# Patient Record
Sex: FEMALE | Race: WHITE | NOT HISPANIC OR LATINO | Employment: OTHER | URBAN - METROPOLITAN AREA
[De-identification: names, ages, dates, MRNs, and addresses within clinical notes are randomized per-mention and may not be internally consistent; named-entity substitution may affect disease eponyms.]

---

## 2024-08-02 ENCOUNTER — OFFICE VISIT (OUTPATIENT)
Dept: OBGYN CLINIC | Facility: MEDICAL CENTER | Age: 59
End: 2024-08-02
Payer: COMMERCIAL

## 2024-08-02 ENCOUNTER — APPOINTMENT (OUTPATIENT)
Dept: RADIOLOGY | Facility: MEDICAL CENTER | Age: 59
End: 2024-08-02
Payer: COMMERCIAL

## 2024-08-02 VITALS
DIASTOLIC BLOOD PRESSURE: 86 MMHG | BODY MASS INDEX: 28.55 KG/M2 | HEART RATE: 71 BPM | HEIGHT: 61 IN | SYSTOLIC BLOOD PRESSURE: 122 MMHG | WEIGHT: 151.2 LBS

## 2024-08-02 DIAGNOSIS — M25.552 PAIN IN LEFT HIP: Primary | ICD-10-CM

## 2024-08-02 DIAGNOSIS — M25.552 PAIN IN LEFT HIP: ICD-10-CM

## 2024-08-02 PROCEDURE — 99204 OFFICE O/P NEW MOD 45 MIN: CPT | Performed by: ORTHOPAEDIC SURGERY

## 2024-08-02 PROCEDURE — 73502 X-RAY EXAM HIP UNI 2-3 VIEWS: CPT

## 2024-08-02 RX ORDER — CETIRIZINE HYDROCHLORIDE 5 MG/1
5 TABLET, CHEWABLE ORAL DAILY
COMMUNITY

## 2024-08-02 RX ORDER — ALBUTEROL SULFATE 90 UG/1
2 AEROSOL, METERED RESPIRATORY (INHALATION) EVERY 6 HOURS PRN
COMMUNITY
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
## 2024-12-26 ENCOUNTER — HOSPITAL ENCOUNTER (OUTPATIENT)
Dept: HOSPITAL 99 - PAVMRI | Age: 59
End: 2024-12-26
Payer: COMMERCIAL

## 2024-12-26 DIAGNOSIS — R20.2: ICD-10-CM

## 2024-12-26 DIAGNOSIS — R20.0: Primary | ICD-10-CM

## 2025-01-21 ENCOUNTER — OFFICE VISIT (OUTPATIENT)
Dept: NEUROLOGY | Facility: CLINIC | Age: 60
End: 2025-01-21
Payer: COMMERCIAL

## 2025-01-21 ENCOUNTER — TRANSCRIBE ORDERS (OUTPATIENT)
Dept: SLEEP CENTER | Facility: CLINIC | Age: 60
End: 2025-01-21

## 2025-01-21 VITALS
TEMPERATURE: 97.7 F | BODY MASS INDEX: 28.77 KG/M2 | SYSTOLIC BLOOD PRESSURE: 120 MMHG | WEIGHT: 152.4 LBS | HEIGHT: 61 IN | DIASTOLIC BLOOD PRESSURE: 78 MMHG | HEART RATE: 81 BPM | OXYGEN SATURATION: 98 %

## 2025-01-21 DIAGNOSIS — Z87.820 HISTORY OF CONCUSSION: ICD-10-CM

## 2025-01-21 DIAGNOSIS — G47.9 SLEEP DIFFICULTIES: ICD-10-CM

## 2025-01-21 DIAGNOSIS — G44.309 POST-TRAUMATIC HEADACHE: ICD-10-CM

## 2025-01-21 DIAGNOSIS — G43.709 CHRONIC MIGRAINE WITHOUT AURA WITHOUT STATUS MIGRAINOSUS, NOT INTRACTABLE: Primary | ICD-10-CM

## 2025-01-21 DIAGNOSIS — G47.33 OSA (OBSTRUCTIVE SLEEP APNEA): ICD-10-CM

## 2025-01-21 DIAGNOSIS — G47.33 OSA (OBSTRUCTIVE SLEEP APNEA): Primary | ICD-10-CM

## 2025-01-21 PROCEDURE — G2211 COMPLEX E/M VISIT ADD ON: HCPCS | Performed by: STUDENT IN AN ORGANIZED HEALTH CARE EDUCATION/TRAINING PROGRAM

## 2025-01-21 PROCEDURE — 99205 OFFICE O/P NEW HI 60 MIN: CPT | Performed by: STUDENT IN AN ORGANIZED HEALTH CARE EDUCATION/TRAINING PROGRAM

## 2025-01-21 RX ORDER — VALACYCLOVIR HYDROCHLORIDE 1 G/1
2000 TABLET, FILM COATED ORAL 2 TIMES DAILY
COMMUNITY
Start: 2024-12-16

## 2025-01-21 RX ORDER — CYPROHEPTADINE HYDROCHLORIDE 4 MG/1
4 TABLET ORAL
Qty: 30 TABLET | Refills: 0 | Status: SHIPPED | OUTPATIENT
Start: 2025-01-21

## 2025-01-21 RX ORDER — FLUOXETINE 10 MG/1
1 CAPSULE ORAL DAILY
COMMUNITY
Start: 2024-12-17

## 2025-01-21 RX ORDER — MONTELUKAST SODIUM 10 MG/1
10 TABLET ORAL
COMMUNITY

## 2025-01-21 RX ORDER — FLUTICASONE PROPIONATE 110 UG/1
2 AEROSOL, METERED RESPIRATORY (INHALATION) 2 TIMES DAILY
COMMUNITY

## 2025-01-21 NOTE — PATIENT INSTRUCTIONS
Activity Plan:  General counseling as discussed regarding appropriate level of tolerable physical activity.      Gradual return to physical activity. It is ok to push through light symptoms, we just don't want to significantly exacerbate symptoms.     Additional Testing or Referrals:   - Labs: B12, Folate, TSH, Iron panel, Vitamin D  - Sleep study    Headache/migraine treatment:   Abortive medications (for immediate treatment of a headache): Ok to take ibuprofen or acetaminophen for headaches, but try to limit the amount and frequency that you are taking to avoid medication overuse/rebound headache. Ideally no more than 2-3 days per week.    Bridge  Cyproheptadine - 4mg tablet at bedtime for 30 nights  - Okay to take 1/2 tablet if needed    Over the counter preventive supplements for headaches/migraines - try for 2-3 months at least   (to take every day to help prevent headaches - not to take at the time of headache):  - Magnesium (oxide or glycinate) 400mg daily  - Can occasionally cause stomach upset - if so try at night, with food or stop, rarely can cause diarrhea if so stop.  - Riboflavin (Vitamin B2) 400mg daily - FYI B2 may make your urine bright/neon yellow - find online     Lifestyle Recommendations:  - Maintain good sleep hygiene.  Going to bed and waking up at consistent times, avoiding excessive daytime naps, avoiding caffeinated beverages in the evening, avoid excessive stimulation in the evening and generally using bed primarily for sleeping.  One hour before bedtime would recommend turning lights down lower, decreasing your activity (may read quietly, listen to music at a low volume). When you get into bed, should eliminate all technology (no texting, emailing, playing with your phone, iPad or tablet in bed).  - Maintain good hydration. Drink  2L of fluid a day (4 typical small water bottles)  - Maintain good nutrition. In particular don't skip meals and eat balanced meals regularly.    Education and  Follow-up  - Please contact us if any questions or concerns arise. Of course, try to protect yourself from head injuries, and if any new concerning symptoms or significant blow to the head or body go to the emergency department.  - Follow up in 3 months

## 2025-01-21 NOTE — ASSESSMENT & PLAN NOTE
Ms. Diaz is here today for evaluation of a suspected concussion that occurred in November.  She has been dealing with persistent symptoms since that time including nearly daily headaches, difficulties with sleep, and fatigue.  Outside of getting an MRI with her primary care provider, she has not had any other sort of treatment or intervention for this injury.  We spent an extensive amount of time discussing the nature of concussion and persistent symptoms.  I believe that her headaches are a primary reason for this.  Given the description of her symptoms as well as her prior history of headaches, I suspect she has chronic migraines.  We discussed a variety of potential treatment options, but she was open to trying magnesium and B2 supplements first.  From an abortive standpoint, I will bridge her with a 30-day course of cyproheptadine and emphasized the importance of not taking over-the-counter medication more than 2 to 3 days/week.  Due to the persistent nature of her symptoms I have also ordered some labs as below.  I am also concerned about the possibility of sleep apnea contributing to persistent symptoms as well as morning headaches so I have recommended that she get a sleep study for further evaluation.  Finally, part of today's discussion emphasized the importance of aerobic exercise in recovery so she will work to try and be more active.
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
## 2025-01-21 NOTE — PROGRESS NOTES
Review of Systems   Constitutional:  Negative for appetite change, fatigue and fever.   HENT:  Positive for tinnitus. Negative for hearing loss, trouble swallowing and voice change.    Eyes:  Positive for visual disturbance (difficulty focusing eyes). Negative for photophobia and pain.   Respiratory: Negative.  Negative for shortness of breath.    Cardiovascular: Negative.  Negative for palpitations.   Gastrointestinal:  Positive for nausea. Negative for vomiting.   Endocrine: Negative.  Negative for cold intolerance.   Genitourinary: Negative.  Negative for dysuria, frequency and urgency.   Musculoskeletal:  Negative for back pain, gait problem, myalgias, neck pain and neck stiffness.   Skin: Negative.  Negative for rash.   Allergic/Immunologic: Negative.    Neurological:  Positive for headaches (25-30/30 HA - 5/7 weekly Tylenol/Advil). Negative for dizziness, tremors, seizures, syncope, facial asymmetry, speech difficulty, weakness, light-headedness and numbness.   Hematological: Negative.  Does not bruise/bleed easily.   Psychiatric/Behavioral: Negative.  Negative for confusion, hallucinations and sleep disturbance.    All other systems reviewed and are negative.

## 2025-01-22 ENCOUNTER — HOSPITAL ENCOUNTER (OUTPATIENT)
Dept: CT IMAGING | Facility: HOSPITAL | Age: 60
Discharge: HOME | End: 2025-01-22
Payer: COMMERCIAL

## 2025-01-22 DIAGNOSIS — J34.89 SINUS PRESSURE: ICD-10-CM

## 2025-01-22 DIAGNOSIS — R09.82 PND (POST-NASAL DRIP): ICD-10-CM

## 2025-01-22 DIAGNOSIS — J34.89 RHINORRHEA: ICD-10-CM

## 2025-01-22 LAB
25(OH)D3+25(OH)D2 SERPL-MCNC: 30.6 NG/ML (ref 30–100)
FERRITIN SERPL-MCNC: 147 NG/ML (ref 15–150)
FOLATE SERPL-MCNC: 12.8 NG/ML
IRON SATN MFR SERPL: 8 % (ref 15–55)
IRON SERPL-MCNC: 30 UG/DL (ref 27–159)
TIBC SERPL-MCNC: 372 UG/DL (ref 250–450)
TSH SERPL DL<=0.005 MIU/L-ACNC: 0.86 UIU/ML (ref 0.45–4.5)
UIBC SERPL-MCNC: 342 UG/DL (ref 131–425)
VIT B12 SERPL-MCNC: 431 PG/ML (ref 232–1245)

## 2025-01-22 PROCEDURE — 70486 CT MAXILLOFACIAL W/O DYE: CPT

## 2025-02-04 DIAGNOSIS — G44.309 POST-TRAUMATIC HEADACHE: ICD-10-CM

## 2025-02-04 DIAGNOSIS — G43.709 CHRONIC MIGRAINE WITHOUT AURA WITHOUT STATUS MIGRAINOSUS, NOT INTRACTABLE: ICD-10-CM

## 2025-02-05 RX ORDER — CYPROHEPTADINE HYDROCHLORIDE 4 MG/1
4 TABLET ORAL
Qty: 90 TABLET | Refills: 1 | OUTPATIENT
Start: 2025-02-05

## 2025-02-06 ENCOUNTER — HOSPITAL ENCOUNTER (OUTPATIENT)
Dept: HOSPITAL 99 - WDC | Age: 60
End: 2025-02-06
Payer: COMMERCIAL

## 2025-02-06 DIAGNOSIS — Z13.820: ICD-10-CM

## 2025-02-06 DIAGNOSIS — Z12.31: ICD-10-CM

## 2025-02-06 DIAGNOSIS — Z12.39: Primary | ICD-10-CM

## 2025-02-25 ENCOUNTER — HOSPITAL ENCOUNTER (OUTPATIENT)
Facility: HOSPITAL | Age: 60
Discharge: HOME | End: 2025-02-25
Payer: COMMERCIAL

## 2025-02-25 DIAGNOSIS — G47.33 OSA (OBSTRUCTIVE SLEEP APNEA): ICD-10-CM

## 2025-02-25 PROCEDURE — G0399 HOME SLEEP TEST/TYPE 3 PORTA: HCPCS

## 2025-02-25 NOTE — PROGRESS NOTES
Home Sleep Study Documentation    HOME STUDY DEVICE: Noxturnal no                                           Pallavi G3 yes device # 24      Pre-Sleep Home Study:    Set-up and instructions performed by: Melissa    Technician performed demonstration for Patient: yes    Return demonstration performed by Patient: yes    Written instructions provided to Patient: yes    Patient signed consent form: yes        Post-Sleep Home Study:    Additional comments by Patient:       Home Sleep Study Failed:no:    Failure reason: N/A    Reported or Detected: N/A    Scored by: LAURA Reina

## 2025-02-26 PROCEDURE — 95806 SLEEP STUDY UNATT&RESP EFFT: CPT | Performed by: INTERNAL MEDICINE

## 2025-02-27 ENCOUNTER — RESULTS FOLLOW-UP (OUTPATIENT)
Dept: NEUROLOGY | Facility: CLINIC | Age: 60
End: 2025-02-27

## 2025-02-27 DIAGNOSIS — G47.33 OSA (OBSTRUCTIVE SLEEP APNEA): Primary | ICD-10-CM

## 2025-03-06 DIAGNOSIS — G43.709 CHRONIC MIGRAINE WITHOUT AURA WITHOUT STATUS MIGRAINOSUS, NOT INTRACTABLE: ICD-10-CM

## 2025-03-06 DIAGNOSIS — G44.309 POST-TRAUMATIC HEADACHE: ICD-10-CM

## 2025-03-07 RX ORDER — CYPROHEPTADINE HYDROCHLORIDE 4 MG/1
4 TABLET ORAL
Qty: 90 TABLET | Refills: 1 | Status: SHIPPED | OUTPATIENT
Start: 2025-03-07

## 2025-03-18 ENCOUNTER — TELEPHONE (OUTPATIENT)
Dept: SLEEP CENTER | Facility: CLINIC | Age: 60
End: 2025-03-18

## 2025-03-18 ENCOUNTER — OFFICE VISIT (OUTPATIENT)
Dept: SLEEP CENTER | Facility: CLINIC | Age: 60
End: 2025-03-18
Payer: COMMERCIAL

## 2025-03-18 VITALS
DIASTOLIC BLOOD PRESSURE: 72 MMHG | BODY MASS INDEX: 28.51 KG/M2 | HEIGHT: 61 IN | HEART RATE: 70 BPM | OXYGEN SATURATION: 98 % | SYSTOLIC BLOOD PRESSURE: 118 MMHG | WEIGHT: 151 LBS

## 2025-03-18 DIAGNOSIS — G43.709 CHRONIC MIGRAINE WITHOUT AURA WITHOUT STATUS MIGRAINOSUS, NOT INTRACTABLE: ICD-10-CM

## 2025-03-18 DIAGNOSIS — J30.9 ALLERGIC RHINITIS, UNSPECIFIED SEASONALITY, UNSPECIFIED TRIGGER: ICD-10-CM

## 2025-03-18 DIAGNOSIS — G47.33 OSA (OBSTRUCTIVE SLEEP APNEA): Primary | ICD-10-CM

## 2025-03-18 PROCEDURE — 99204 OFFICE O/P NEW MOD 45 MIN: CPT | Performed by: STUDENT IN AN ORGANIZED HEALTH CARE EDUCATION/TRAINING PROGRAM

## 2025-03-18 RX ORDER — NAPROXEN SODIUM 220 MG/1
TABLET, FILM COATED ORAL
COMMUNITY

## 2025-03-18 RX ORDER — METHYLPREDNISOLONE 4 MG/1
TABLET ORAL
COMMUNITY
Start: 2024-12-27

## 2025-03-18 RX ORDER — NIACIN 250 MG
400 TABLET ORAL
COMMUNITY

## 2025-03-18 RX ORDER — CHOLECALCIFEROL (VITAMIN D3) 25 MCG
CAPSULE ORAL DAILY
COMMUNITY

## 2025-03-18 NOTE — ASSESSMENT & PLAN NOTE
There is an association of migraine headaches and sleep disordered breathing.  The patient reports regular a.m. headaches lasting less than 30 minutes that are more frontal in nature.  We discussed that this may be related to sleep disordered breathing.  Patient will initiate CPAP as above and follow-up within 3 months.  Orders:    CPAP [facility] DME

## 2025-03-18 NOTE — PROGRESS NOTES
Name: Carina Diaz      : 1965      MRN: [id number]  Encounter Provider: Emmanuel Rodriguez MD  Encounter Date: 3/18/2025   Encounter department: [facility] SLEEP MEDICINE [facility]    :  Assessment & Plan  ELIO (obstructive sleep apnea)  Moderate Obstructive Sleep Apnea - Discussed pathophysiology of ELIO, consequences of untreated ELIO and treatment options including PAP therapy. - Discussed risks of sleepy driving and mitigation strategies (napping). Patient agrees to not drive if tired or sleepy. - Advised avoidance of alcohol and centrally acting medications as these can worsen ELIO.  -Patient presents for follow-up of recent home sleep study which revealed mild almost moderate obstructive sleep apnea with significant hypoxia.  We reviewed the home sleep study results in detail today in the office.  - The patient elects to start auto CPAP therapy.  I have placed an order for an auto CPAP device with a nasal pillow interface for the patient today in the office.  I reviewed insurance compliance including use the machine for at least 4 hours nightly for at least 70% of nights.  - I have asked the patient to return to the office within the next 3 months for close clinical follow-up and initial CPAP compliance visit.  Patient verbalized understanding.  Orders:    Ambulatory Referral to Sleep Medicine    CPAP Auto New DME    Chronic migraine without aura without status migrainosus, not intractable  There is an association of migraine headaches and sleep disordered breathing.  The patient reports regular a.m. headaches lasting less than 30 minutes that are more frontal in nature.  We discussed that this may be related to sleep disordered breathing.  Patient will initiate CPAP as above and follow-up within 3 months.  Orders:    CPAP Auto New DME    Allergic rhinitis, unspecified seasonality, unspecified trigger  Patient with history of regular nasal sinus congestion.  I reviewed the importance of the  "nose and nasal breathing on sleep.  Continue control of nasal sinus congestion and follow-up for initial CPAP compliance visit as above.         History of Present Illness       Sitting and reading: (Patient-Rptd) Would never doze  Watching TV: (Patient-Rptd) Slight chance of dozing  Sitting, inactive in a public place (e.g. a theatre or a meeting): (Patient-Rptd) Would never doze  As a passenger in a car for an hour without a break: (Patient-Rptd) Would never doze  Lying down to rest in the afternoon when circumstances permit: (Patient-Rptd) Moderate chance of dozing  Sitting and talking to someone: (Patient-Rptd) Would never doze  Sitting quietly after a lunch without alcohol: (Patient-Rptd) Slight chance of dozing  In a car, while stopped for a few minutes in traffic: (Patient-Rptd) Would never doze  Total score: (Patient-Rptd) 4     Pertinent positives/negatives included in HPI and also as noted:       Objective   /72   Pulse 70   Ht 5' 1\" (1.549 m)   Wt 68.5 kg (151 lb)   SpO2 98%   BMI 28.53 kg/m²     Neck Circumference: 13  [facility] New Patient Evaluation    Ms. Diaz is a 59 y.o. female with a PMH of migraine headaches, who presents as a new patient for evaluation of sleep disordered breathing.     I have reviewed the 2/7/2025 otolaryngology office note with Kirk Gunn MD.     I have reviewed the 1/21/2025 neurology office note with    Rohan Mane DO.     History of Presenting Illness:    The patient snores. There are choking and gasping episodes. There are no witnessed apneas. 1-2x episode(s) of nocturia occur per night. The patient sleeps on her side. She sleeps with one pillow. There are no reports of nocturnal behaviors.     The patient's [place] sleepiness scale score is 4/24 (<=10 is normal). She has not been sleepy while driving. She has not had a fall-asleep motor vehicle accident. There are no reports of hypnagogic hallucinations, cataplexy or sleep paralysis.    In " terms of the patient's sleep/wake cycle symptoms:  Bedtime: 9 PM  SOL: Less than 15 minutes  Nocturnal awakenings: 1-2 X, nocturia  Wakeup time: 6:30 AM  Naps: Denied    Total sleep time estimate: Approximately 8-9 hours.     On weekends she will fall asleep around 10 PM and sleep until 6:30 AM.    She has tried Benadryl for poor sleep in the past.    Her legs do not bother her on trying to initiate sleep regularly.    She underwent a home sleep study on 2/25/2025 at a weight of 152 pounds.  This home sleep study revealed mild almost moderate obstructive sleep apnea with an NICK of 14.7 events per hour.  O2 kimberly 79%.  There was significant positional component to sleep disordered breathing.  The patient also spent approximately 32 minutes with SpO2 under 90%.    Patient elects to start with auto CPAP for sleep disordered breathing.  For close clinical follow-up as above.    No past medical history on file.     No past surgical history on file.     No prior upper airway surgeries in the past.     Allergies   Allergen Reactions    Erythromycin Vomiting        Current Outpatient Medications on File Prior to Visit   Medication Sig Dispense Refill    albuterol (PROVENTIL HFA,VENTOLIN HFA) 90 mcg/act inhaler Inhale 2 puffs every 6 (six) hours as needed for wheezing      cetirizine (ZyrTEC) 5 MG chewable tablet Chew 5 mg daily      Cholecalciferol (KP Vitamin D) 25 MCG (1000 UT) capsule Take by mouth daily      FLUoxetine (PROzac) 10 mg capsule Take 1 capsule by mouth in the morning      fluticasone (FLOVENT HFA) 110 MCG/ACT inhaler Inhale 2 puffs 2 (two) times a day Rinse mouth after use.      Magnesium Bisglycinate (MAG GLYCINATE PO) Take 400 mg by mouth      methylPREDNISolone 4 MG tablet therapy pack TAKE 6 TABLETS ON DAY 1 AS DIRECTED ON PACKAGE AND DECREASE BY 1 TAB EACH DAY FOR A TOTAL OF 6 DAYS      montelukast (SINGULAIR) 10 mg tablet Take 10 mg by mouth daily at bedtime      naproxen sodium (ALEVE) 220 MG tablet  "Take by mouth      niacin 250 MG tablet Take 400 mg by mouth daily with breakfast      valACYclovir (VALTREX) 1,000 mg tablet Take 2,000 mg by mouth 2 (two) times a day      cyproheptadine (PERIACTIN) 4 mg tablet TAKE 1 TABLET BY MOUTH DAILY AT BEDTIME 90 tablet 1    ipratropium (ATROVENT) 0.03 % nasal spray SPRAY 2 SPRAYS INTO INTO EACH NOSTRIL 3 TIMES A DAY AS NEEDED FOR RHINITIS 90 mL 3     No current facility-administered medications on file prior to visit.     Family History: Her family history is not on file.    Father had sleep apnea and was fitted for a CPAP.     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     Patient's medications, allergies, past medical, surgical, social and family histories were reviewed in the electronic medical record and updated as appropriate.    Review of Systems: On review of systems, the patient reports: Regular AM Headaches, occasional regular nasal sinus congestion, does not wake with dry mouth and dry throat in morning.    Vitals:    03/18/25 0948   BP: 118/72   Pulse: 70   SpO2: 98%       Physical Examination:  Gen: No acute distress, not visibly anxious, speaking comfortably  H&N: MM III; no retro/micrognathia; no macroglossia; no visible thyromegaly  Neuro: Alert and oriented x3, interactive  Psych: Pleasant, normal affect  Skin: No visible rashes  Respiratory: No accessory muscle use, breathing comfortably  Cardiac: No visible edema of extremities  Abdomen: Soft, NT, nondistended  Musculoskeletal: Normal ROM Intact of Extremities    Study Results:  I reviewed the following labs:    Lab Results   Component Value Date    FERRITIN 147 01/21/2025       No results found for: \"WBC\", \"HGB\", \"HCT\", \"MCV\", \"PLT\"    This SmartLink has not been configured with any " "valid records.       No results found for: \"SODIUM\", \"K\", \"CL\", \"CO2\", \"BUN\", \"CREATININE\", \"GLUC\", \"CALCIUM\"    This SmartLink has not been configured with any valid records.       Lab Results   Component Value Date    TSH 0.861 01/21/2025          Results/Data:  I have reviewed the results and report from the 1/22/2025 CT sinus.    Independent Findings Reviewed: Sinus mucosal disease.  Severely narrowed or nearly occluded left maxillary sinus ostium.  Markedly leftward deviated nasal septum.    I have reviewed the results and report from the 8/2/24 hip x-ray.    Independent Findings Reviewed: No acute osseous abnormality of the hip.    I answered the patient's questions to the best of my ability. We will continue with longitudinal follow-up for evaluation of sleep disordered breathing. Follow-up will be after sleep testing is completed.    Emmanuel Rodriguez MD  Sleep Medicine and Internal Medicine  [facility]  03/18/25      Portions of the record may have been created with voice recognition software.  Occasional wrong word or \"sound a like\" substitutions may have occurred due to the inherent limitations of voice recognition software.  Read the chart carefully and recognize, using context, where substitutions have occurred.       "

## 2025-03-18 NOTE — PATIENT INSTRUCTIONS
"Starting CPAP    1.  The home medical company will give you a call within the next 2 weeks to set up your new CPAP machine.  You will be able to be set up at multiple locations.  Pick the one that is easiest for your schedule and attend that appointment to get started on CPAP.    2.  You will be initially prescribed a mask interface to use with the CPAP machine.  Try the CPAP machine for the first 7 to 10 days, if after that time you are having difficulty adjusting to CPAP please call the [employer] system company and they will allow you to switch out the mask multiple times during the first month.    3.  Start using CPAP at night or during the day to acclimate to using a mask interface.  Continue to use regularly until you are seen in follow-up.  Reminder that insurance companies will ask for you to use the machine for at least 4 hours in a 24-hour period for at least 70% of days.  Please make sure you are using the machine regularly so we can assess your compliance and follow-up and so you meet insurance compliance rules.    4.  When you are seen in follow-up, we will make adjustments to the CPAP machine as necessary to make it more comfortable for you.  There are alternative mask, pressure changes, humidification changes, and alternative ways we can make CPAP easier and more tolerable for you at night.        Patient Education     Sleep apnea in adults   The Basics   Written by the doctors and editors at [facility]   What is sleep apnea? -- Sleep apnea is a condition that makes you stop breathing for short periods while you are asleep. There are 2 types of sleep apnea. One is called \"obstructive sleep apnea.\" The other is called \"central sleep apnea.\"  In obstructive sleep apnea, you stop breathing because your throat narrows or closes (figure 1). In central sleep apnea, you stop breathing because your brain does not send the right signals to your muscles to make you breathe. When people talk about sleep apnea, they are " "usually referring to obstructive sleep apnea, which is what this article is about.  People with sleep apnea do not know that they stop breathing when they are asleep. But they do sometimes wake up startled or gasping for breath. They also often hear from loved ones that they snore.  What are the symptoms of sleep apnea? -- The main symptoms of sleep apnea are loud snoring, tiredness, and daytime sleepiness. Other symptoms can include:   Restless sleep   Waking up choking or gasping   Morning headaches, dry mouth, or sore throat   Waking up often to urinate   Waking up feeling unrested or groggy   Trouble thinking clearly or remembering things  Some people with sleep apnea don't have symptoms, or don't realize that they have them.  Should I see a doctor or nurse? -- Yes. If you think that you might have sleep apnea, see your doctor.  Is there a test for sleep apnea? -- Yes. First, your doctor or nurse will ask about your symptoms. If you have a bed partner, they might also ask that person if you snore or gasp in your sleep. If the doctor or nurse suspects that you have sleep apnea, they might send you for a \"sleep study.\"  Sleep studies can sometimes be done at home, but they are usually done in a sleep lab. For the study, you spend the night in the lab, and you are hooked up to different machines that monitor your heart rate, breathing, and other body functions. The results of the test tell your doctor or nurse if you have the disorder.  Is there anything I can do on my own to help my sleep apnea? -- Yes. Some things that might help:   Try to avoid sleeping on your back, if possible. This might help some people.   Lose weight, if you have excess body weight.   Get regular physical activity. This might help you lose weight. But even if it doesn't, being active is good for your health.   Avoid alcohol, especially in the evening. Alcohol can make sleep apnea worse.  How is sleep apnea treated? -- Treatment can " "include:   Weight loss - As mentioned above, weight loss can help if you have excess weight or obesity. But losing weight can be challenging, and it takes time to lose enough weight to help with your sleep apnea. Most people need other treatment while they work on losing weight.   CPAP - The most effective treatment for sleep apnea is a device that keeps your airway open while you sleep. Treatment with this device is called \"continuous positive airway pressure\" (\"CPAP\"). People getting CPAP wear a face mask at night that keeps them breathing (figure 2).  If your doctor or nurse recommends a CPAP machine, be patient about using it. The mask might seem uncomfortable to wear at first, and the machine might seem noisy, but using the machine can really help you. People with sleep apnea who use a CPAP machine feel more rested and generally feel better.  If CPAP does not work, your doctor might suggest other treatment. Options might include:   An oral device - This is a device that you wear in your mouth. It is called an \"oral appliance\" or \"mandibular advancement device.\" It helps keep your airway open while you sleep.   Hypoglossal nerve stimulation - This involves a procedure to implant a small device into your chest. The device has a wire that connects to the nerve under your tongue. While you are sleeping, it sends an electrical signal that causes the tongue to push forward. This helps open up your airway.   Surgery to widen your airway - This might involve removing your tonsils or other tissue that blocks the airway.  Is sleep apnea dangerous? -- It can be. Risks include:   Accidents - People with sleep apnea do not get good-quality sleep, so they are often tired and not alert. This puts them at risk for car accidents and other types of accidents.   Other health problems - Studies show that people with sleep apnea are more likely than others to have high blood pressure, heart attacks, and other serious heart " problems. Some people also have mood changes or depression.  In people with severe sleep apnea, getting treated (for example, with CPAP) can help lower these risks.  All topics are updated as new evidence becomes available and our peer review process is complete.  This topic retrieved from [employer] on: Feb 28, 2024.  Topic 93440 Version 18.0  Release: 32.2.4 - C32.58  © 2024 UpToDate, Inc. and/or its affiliates. All rights reserved.  figure 1: Airway in a person with sleep apnea     Normally, when a person sleeps, the airway remains open, and air can pass from the nose and mouth to the lungs. In a person with sleep apnea, parts of the throat and mouth drop into the airway and block off the flow of air. This can cause loud snoring and interrupt breathing for short periods.  Graphic 43996 Version 6.0  figure 2: Continuous positive airway pressure (CPAP) for sleep apnea     The CPAP mask gently blows air into your nose while you sleep. It puts just enough pressure on your airway to keep it from closing. The mask in this picture fits over just the nose. Other CPAP devices have masks that fit over the nose and mouth.  Graphic 02849 Version 5.0  Consumer Information Use and Disclaimer   Disclaimer: This generalized information is a limited summary of diagnosis, treatment, and/or medication information. It is not meant to be comprehensive and should be used as a tool to help the user understand and/or assess potential diagnostic and treatment options. It does NOT include all information about conditions, treatments, medications, side effects, or risks that may apply to a specific patient. It is not intended to be medical advice or a substitute for the medical advice, diagnosis, or treatment of a health care provider based on the health care provider's examination and assessment of a patient's specific and unique circumstances. Patients must speak with a health care provider for complete information about their health,  medical questions, and treatment options, including any risks or benefits regarding use of medications. This information does not endorse any treatments or medications as safe, effective, or approved for treating a specific patient. UpToDate, Inc. and its affiliates disclaim any warranty or liability relating to this information or the use thereof.The use of this information is governed by the Terms of Use, available at https://www.[employer].com/en/know/clinical-effectiveness-terms. 2024© UpToDate, Inc. and its affiliates and/or licensors. All rights reserved.  Copyright   © 2024 UpToDate, Inc. and/or its affiliates. All rights reserved.

## 2025-03-18 NOTE — ASSESSMENT & PLAN NOTE
Moderate Obstructive Sleep Apnea - Discussed pathophysiology of ELIO, consequences of untreated ELIO and treatment options including PAP therapy. - Discussed risks of sleepy driving and mitigation strategies (napping). Patient agrees to not drive if tired or sleepy. - Advised avoidance of alcohol and centrally acting medications as these can worsen ELIO.  -Patient presents for follow-up of recent home sleep study which revealed mild almost moderate obstructive sleep apnea with significant hypoxia.  We reviewed the home sleep study results in detail today in the office.  - The patient elects to start auto CPAP therapy.  I have placed an order for an auto CPAP device with a nasal pillow interface for the patient today in the office.  I reviewed insurance compliance including use the machine for at least 4 hours nightly for at least 70% of nights.  - I have asked the patient to return to the office within the next 3 months for close clinical follow-up and initial CPAP compliance visit.  Patient verbalized understanding.  Orders:    Ambulatory Referral to Sleep Medicine    CPAP Auto New DME

## 2025-03-18 NOTE — TELEPHONE ENCOUNTER
Rx for APAP and clinicals sent to [facility] via [place].

## 2025-03-19 LAB

## 2025-03-21 ENCOUNTER — HOSPITAL ENCOUNTER (OUTPATIENT)
Dept: HOSPITAL 99 - RCS | Age: 60
End: 2025-03-21
Payer: COMMERCIAL

## 2025-03-21 DIAGNOSIS — Z71.89: Primary | ICD-10-CM

## 2025-03-26 LAB

## 2025-04-07 LAB

## 2025-04-18 ENCOUNTER — TELEPHONE (OUTPATIENT)
Age: 60
End: 2025-04-18

## 2025-04-18 NOTE — TELEPHONE ENCOUNTER
Patient called to [facility] appointment due to a family emergency; scheduled 6/3/25 9:30 AM DO KAHLIL Vale.  Patient stated she is doing much better since utilizing CPAP and wanted Dr. Mane to be aware; stated currently taking supplements as advised by provider and wanted to know how long she should be taking them (Magnesium (oxide or glycinate) 400mg daily and Riboflavin (Vitamin B2) 400mg daily).  Patient requested someone respond via my chart message.  Advised will forward request.    Please assist,    Thank you

## 2025-06-02 ENCOUNTER — OFFICE VISIT (OUTPATIENT)
Dept: SLEEP CENTER | Facility: HOSPITAL | Age: 60
End: 2025-06-02
Payer: COMMERCIAL

## 2025-06-02 VITALS
DIASTOLIC BLOOD PRESSURE: 76 MMHG | HEART RATE: 75 BPM | SYSTOLIC BLOOD PRESSURE: 110 MMHG | OXYGEN SATURATION: 95 % | BODY MASS INDEX: 28.7 KG/M2 | WEIGHT: 152 LBS | HEIGHT: 61 IN

## 2025-06-02 DIAGNOSIS — G47.33 OSA (OBSTRUCTIVE SLEEP APNEA): Primary | ICD-10-CM

## 2025-06-02 PROCEDURE — 99213 OFFICE O/P EST LOW 20 MIN: CPT | Performed by: PSYCHIATRY & NEUROLOGY

## 2025-06-02 NOTE — ASSESSMENT & PLAN NOTE
-Doing great but describes aerophagia from her CPAP machine, likely would do well with her reduction in pressure  - As such, I reduce her CPAP settings to 4-9 cm H2O and turn the EPR up to 3  -Discussed that when her allergies resolve she should return to the nasal pillows which were more comfortable for her  Orders:  •  PAP DME Pressure Change

## 2025-06-02 NOTE — PATIENT INSTRUCTIONS
If you do not like the air pressure change on your machine, please contact me through the patient portal.  Sometimes it may take a few days to get used to the new air pressure.     You are doing great with use of your machine.  .  On your prior diagnostic sleep study you had 14.7 times per hour of sleep apnea events.  With your machine, you have 1 times per hour of sleep apnea episodes.     The goal is to use CPAP all night long, more usage= more health benefit and more symptomatic improvement.  If any barriers or problems arise that lead to difficulty using your machine, please let me know either through a [employer] message or alternatively, by calling my office.

## 2025-06-02 NOTE — PROGRESS NOTES
Name: Carina Diaz      : 1965      MRN: [id number]  Encounter Provider: Kirk Vilchis MD  Encounter Date: 2025   Encounter department: [facility] [place]  :  Assessment & Plan  ELIO (obstructive sleep apnea)  -Doing great but describes aerophagia from her CPAP machine, likely would do well with her reduction in pressure  - As such, I reduce her CPAP settings to 4-9 cm H2O and turn the EPR up to 3  -Discussed that when her allergies resolve she should return to the nasal pillows which were more comfortable for her  Orders:  •  PAP DME Pressure Change      Assessment & Plan        History of Present Illness   History of Present Illness  This is a 59-year-old female who presents in follow-up, she is new to my care.  She last saw Dr. Denny Rodriguez in 2025.  At that visit, he reviewed her sleep study with her which showed mild, almost moderate obstructive sleep apnea with a respiratory event index of 14.7, events worse in the supine position.  She had presented with symptoms of snoring, choking gasping, nocturia    Cc- things are great unless I'm congested    Carina describes that CPAP has been very beneficial to her.  She has more energy and does not snore.  She notes that she did well with nasal pillows but due to allergies she recently switched to a fullface mask.  With a fullface mask she finds it hard to exhale and has had bloating in her stomach/aerophagia.  She has been awakening with headaches more often but these headaches are different than what she had prior to treating obstructive sleep apnea.  For the most part her headaches are improved with use of CPAP    CPAP data reviewed by me today  AirSense 11 set at 6 to 15 cm H2O  Average session 5 hours 4 minutes used 27 over 30 days  AHI 0.8  Median pressure 7.1     95% pressure 9.8  No significant mask leakage  Uses a full face mask     Carina has some AM headaches and finds it hard to exhale at times;  sometimes  Pt PASSR completed for [facility] "removes her mask at night     Benefits of PAP- better sleep, more energy, improvement in headaches     Carina describes allergies and asthma, under treatment.  Sees Dr Mane in [facility] for allergies.             Sitting and reading: Would never doze  Watching TV: Slight chance of dozing  Sitting, inactive in a public place (e.g. a theatre or a meeting): Would never doze  As a passenger in a car for an hour without a break: Would never doze  Lying down to rest in the afternoon when circumstances permit: Moderate chance of dozing  Sitting and talking to someone: Would never doze  Sitting quietly after a lunch without alcohol: Would never doze  In a car, while stopped for a few minutes in traffic: Would never doze  Total score: 3     Review of Systems  Pertinent positives/negatives included in HPI and also as noted:       Objective   /76 (BP Location: Left arm, Patient Position: Sitting, Cuff Size: Large)   Pulse 75   Ht 5' 1\" (1.549 m)   Wt 68.9 kg (152 lb)   SpO2 95%   BMI 28.72 kg/m²        Physical Exam  Physical Exam    Visit Vitals  /76 (BP Location: Left arm, Patient Position: Sitting, Cuff Size: Large)   Pulse 75   Ht 5' 1\" (1.549 m)   Wt 68.9 kg (152 lb)   SpO2 95%   BMI 28.72 kg/m²   Smoking Status Never   BSA 1.68 m²                                               Results    Data  No results found for: \"HGB\", \"HCT\", \"MCV\"   No results found for: \"GLUCOSE\", \"CALCIUM\", \"NA\", \"K\", \"CO2\", \"CL\", \"BUN\", \"CREATININE\"  Lab Results   Component Value Date    IRON 30 01/21/2025    TIBC 372 01/21/2025    FERRITIN 147 01/21/2025     No results found for: \"AST\", \"ALT\"      "

## 2025-06-03 ENCOUNTER — TELEPHONE (OUTPATIENT)
Dept: SLEEP CENTER | Facility: CLINIC | Age: 60
End: 2025-06-03

## 2025-06-03 ENCOUNTER — OFFICE VISIT (OUTPATIENT)
Dept: NEUROLOGY | Facility: CLINIC | Age: 60
End: 2025-06-03
Payer: COMMERCIAL

## 2025-06-03 VITALS
HEIGHT: 61 IN | HEART RATE: 79 BPM | BODY MASS INDEX: 28.85 KG/M2 | DIASTOLIC BLOOD PRESSURE: 70 MMHG | TEMPERATURE: 98 F | WEIGHT: 152.8 LBS | SYSTOLIC BLOOD PRESSURE: 124 MMHG | OXYGEN SATURATION: 99 %

## 2025-06-03 DIAGNOSIS — G47.33 OSA (OBSTRUCTIVE SLEEP APNEA): ICD-10-CM

## 2025-06-03 DIAGNOSIS — G44.309 POST-TRAUMATIC HEADACHE: ICD-10-CM

## 2025-06-03 DIAGNOSIS — G43.709 CHRONIC MIGRAINE WITHOUT AURA WITHOUT STATUS MIGRAINOSUS, NOT INTRACTABLE: Primary | ICD-10-CM

## 2025-06-03 DIAGNOSIS — Z87.820 HISTORY OF CONCUSSION: ICD-10-CM

## 2025-06-03 LAB
DME PARACHUTE DELIVERY DATE ACTUAL: NORMAL
DME PARACHUTE DELIVERY DATE REQUESTED: NORMAL
DME PARACHUTE ITEM DESCRIPTION: NORMAL
DME PARACHUTE ORDER STATUS: NORMAL
DME PARACHUTE SUPPLIER NAME: NORMAL
DME PARACHUTE SUPPLIER PHONE: NORMAL

## 2025-06-03 PROCEDURE — 99213 OFFICE O/P EST LOW 20 MIN: CPT | Performed by: STUDENT IN AN ORGANIZED HEALTH CARE EDUCATION/TRAINING PROGRAM

## 2025-06-03 NOTE — ASSESSMENT & PLAN NOTE
At today's visit, she reports that she is doing exceptionally well.  She feels that the majority of her symptoms including her headaches have significantly improved since our last visit.  She continues with magnesium and B2 supplements daily.  She may continue with those long-term if she prefers or she can try and slowly decrease the dose and stop them.  She is also working with sleep medicine regularly and using the CPAP nightly which she finds to be extremely effective with the quality of her sleep.  She will follow-up with me as needed going forward.

## 2025-06-03 NOTE — PROGRESS NOTES
Neurology Ambulatory Visit  Name: Carina Diaz       : 1965       MRN: [id number]   Encounter Provider: Rohan Mane DO   Encounter Date: 6/3/2025  Encounter department: [facility]    Carina Diaz is a very pleasant 59 y.o. female with a past medical history that includes prior concussion, allergies, asthma here for f/u evaluation of mild TBI/concussion and headaches.    Assessment and Plan  1. Chronic migraine without aura without status migrainosus, not intractable  Assessment & Plan:  At today's visit, she reports that she is doing exceptionally well.  She feels that the majority of her symptoms including her headaches have significantly improved since our last visit.  She continues with magnesium and B2 supplements daily.  She may continue with those long-term if she prefers or she can try and slowly decrease the dose and stop them.  She is also working with sleep medicine regularly and using the CPAP nightly which she finds to be extremely effective with the quality of her sleep.  She will follow-up with me as needed going forward.  2. Post-traumatic headache  3. ELIO (obstructive sleep apnea)  4. History of concussion      She will Return if symptoms worsen or fail to improve.    Workup  - Neurocognitive assessment reveals normal neurological exam.  - MRI brain with and without contrast 2024: No acute intracranial findings.  Mild T2/FLAIR hyperintensities, similar to prior exam in .  No postcontrast enhancement.  1 cm pineal gland cyst.  - Home sleep study 2025: Moderate obstructive sleep apnea     Preventative:  - we discussed headache hygiene and lifestyle factors that may improve headaches  - Mg and B2  - Currently on through other providers: None  - Past/ failed/contraindicated: Fluoxetine  - future options: TCA/SNRI, Propranolol, Topamax, Memantine, Diamox, CGRP med, botox     Acute:  - discussed not taking over-the-counter or prescription  pain medications more than 3 days per week to prevent medication overuse/rebound headache  - Currently on through other providers: None  - Past/ failed/contraindicated: None  - future options:  Triptan, prochlorperazine, Toradol IM or p.o., could consider trial of 5 days of Depakote 500 mg nightly or dexamethasone 2 mg daily for prolonged migraine, ubrelvy, reyvow, nurtec, zavzpret    History of Present Illness       Interval updates:  6/3/25: At today's visit, she reports overall improvement in terms of her symptoms.  She currently endorses about 2 headache days per month of which 1 may be more severe.  She is taking magnesium and B2 supplements daily.  She has also been using a CPAP which she finds to be helpful. She has been using her CPAP since April.     Prior History  1/21/25: Ms. Diaz is here today for evaluation of a suspected concussion that occurred in November.  She has been dealing with persistent symptoms since that time including nearly daily headaches, difficulties with sleep, and fatigue.  Outside of getting an MRI with her primary care provider, she has not had any other sort of treatment or intervention for this injury.  We spent an extensive amount of time discussing the nature of concussion and persistent symptoms.  I believe that her headaches are a primary reason for this.  Given the description of her symptoms as well as her prior history of headaches, I suspect she has chronic migraines.  We discussed a variety of potential treatment options, but she was open to trying magnesium and B2 supplements first.  From an abortive standpoint, I will bridge her with a 30-day course of cyproheptadine and emphasized the importance of not taking over-the-counter medication more than 2 to 3 days/week.  Due to the persistent nature of her symptoms I have also ordered some labs as below.  I am also concerned about the possibility of sleep apnea contributing to persistent symptoms as well as morning  "headaches so I have recommended that she get a sleep study for further evaluation.  Finally, part of today's discussion emphasized the importance of aerobic exercise in recovery so she will work to try and be more active.         Objective     Physical Exam:                                                               Vitals:            Constitutional:    /70 (BP Location: Left arm, Patient Position: Sitting, Cuff Size: Standard)   Pulse 79   Temp 98 °F (36.7 °C) (Temporal)   Ht 5' 1\" (1.549 m)   Wt 69.3 kg (152 lb 12.8 oz)   SpO2 99%   BMI 28.87 kg/m²   BP Readings from Last 3 Encounters:   06/03/25 124/70   06/02/25 110/76   03/18/25 118/72     Pulse Readings from Last 3 Encounters:   06/03/25 79   06/02/25 75   03/18/25 70         Well developed, well nourished, well groomed. No dysmorphic features.       HEENT:  Normocephalic atraumatic. See neuro exam   Chest:  Respirations appear regular and unlabored.    Cardiovascular:  no observed significant swelling.    Musculoskeletal:  (see below under neurologic exam for evaluation of motor function and gait)   Skin:  warm and dry, not diaphoretic.    Psychiatric:  Normal behavior and appropriate affect       Neurological Examination:     Mental status/cognitive function:   Recent and remote memory intact. Attention span and concentration as well as fund of knowledge are appropriate for age. Normal language and spontaneous speech.  Cranial Nerves:  III, IV, VI-Pupils were equal, round. Extraocular movements were full and conjugate   VII-facial expression symmetric  VIII-hearing grossly intact bilaterally   Motor Exam: symmetric bulk throughout. no atrophy, fasciculations or abnormal movements noted.   Coordination:  no apparent dysmetria, ataxia or tremor noted  Gait: steady casual gait      Voice recognition software was used in the generation of this note. There may be unintentional errors including grammatical errors, spelling errors, or pronoun errors. "

## 2025-06-03 NOTE — PROGRESS NOTES
Since your last visit are your headaches Improved    Any change to the headache type? No    What is your current headache frequency (total headache days out of 30): 2/30 (of those, how many are more severe: 0-1)    Are you taking your current medications as prescribed? yes      Do you have any side effects? no    How may days per week do you take an abortive medicine? 0-1/7

## 2025-06-03 NOTE — PATIENT INSTRUCTIONS
Headache/migraine treatment:   Abortive medications (for immediate treatment of a headache): Ok to take ibuprofen or acetaminophen for headaches, but try to limit the amount and frequency that you are taking to avoid medication overuse/rebound headache. Ideally no more than 2-3 days per week.    Over the counter preventive supplements for headaches/migraines  - Magnesium (oxide or glycinate) 400mg daily  - Can occasionally cause stomach upset - if so try at night, with food or stop, rarely can cause diarrhea if so stop.  - Riboflavin (Vitamin B2) 400mg daily - FYI B2 may make your urine bright/neon yellow - find online     Lifestyle Recommendations:  - Maintain good sleep hygiene.  Going to bed and waking up at consistent times, avoiding excessive daytime naps, avoiding caffeinated beverages in the evening, avoid excessive stimulation in the evening and generally using bed primarily for sleeping.  One hour before bedtime would recommend turning lights down lower, decreasing your activity (may read quietly, listen to music at a low volume). When you get into bed, should eliminate all technology (no texting, emailing, playing with your phone, iPad or tablet in bed).  - Maintain good hydration. Drink  2L of fluid a day (4 typical small water bottles)  - Maintain good nutrition. In particular don't skip meals and eat balanced meals regularly.    Education and Follow-up  - Please contact us if any questions or concerns arise. Of course, try to protect yourself from head injuries, and if any new concerning symptoms or significant blow to the head or body go to the emergency department.  - Follow up as needed